# Patient Record
(demographics unavailable — no encounter records)

---

## 2025-04-09 NOTE — PLAN
[FreeTextEntry1] :  elevated lfts and hepatic steatosis on US abdomen  saw gi and will f/u stopped statin for now repeat cmp  leukopenia seen by hemeonc and will f/u  weight loss, multiple recent illlnesses, recent covid a few days ago seen by heme onc and gi check tsh and free t4 if no weight gain once illnesses resolve will consider ct abd pelvis/chest  erythematous papules w/ itching looks like some kind of insect bite as there are insertion sites relief w/ allegra advised to continue no relief with betamethasone  cream so advised to stop  labs drawn in office and will be sent to [facility] core lab  f/u 1month or sooner if issues arise pt agreed w/plan

## 2025-04-09 NOTE — HISTORY OF PRESENT ILLNESS
[FreeTextEntry8] : Patient presents for rash on abdomen and thighs, patient had a rash 3 weeks ago on back and new rash started the next day.  she said she had the same rash 3 weeks ago and then it resolved and then came back again after covid she took allegra and it helped and resolved the itching  she is off the statin for 3 weeks she had labs drawn today she saw Dr Valderrama today and will f/u 1 month she had labs drawn today w/ heme/onc w/ Dr Grijalva she has lost weight but she has been sick recently she had wbc repeated today she will f/u in 1month she is eating regularly and drinking water  she has 3 meals and 3 snacks

## 2025-04-09 NOTE — PHYSICAL EXAM
[No Lymphadenopathy] : no lymphadenopathy [Supple] : supple [No Edema] : there was no peripheral edema [Normal] : soft, non-tender, non-distended, no masses palpated, no HSM and normal bowel sounds [No Focal Deficits] : no focal deficits [Normal Affect] : the affect was normal [Normal Mood] : the mood was normal [Normal Insight/Judgement] : insight and judgment were intact [de-identified] : no calf tenderness b/l LE [de-identified] :  no guarding or rigidity [de-identified] : back with healed and scarred papules w/ insertion sites but no erythema just hyperpigmentation [de-identified] : multiple erythematous papules on abdomen and legs w/ insertion sites

## 2025-04-16 NOTE — HISTORY OF PRESENT ILLNESS
[FreeTextEntry1] : 64 y.o. female with PMHx of asthma and vit B12 deficiency seen initially in 2022 for goiter and Hashimoto's thyroiditis - did not require treatment. Presents today for evaluation of Hyperthyroidism. Patient c/o wt loss 15-20Lb over the last 3 months. Jitteriness, hair falling, fine hand tremor, palpitations, eye discomfort/pressure. Denies neck pain, sore throat or preceding URI. FHx - sister with Hx of Hyperthyroidism and FRITZ Tx. Labs significant for undetectable TSH <0.01 and mildly elevated FT4 1.9, No FT3 available

## 2025-04-16 NOTE — PHYSICAL EXAM
[Alert] : alert [Well Nourished] : well nourished [No Acute Distress] : no acute distress [Well Developed] : well developed [Normal Sclera/Conjunctiva] : normal sclera/conjunctiva [PERRL] : pupils equal, round and reactive to light [Normal Hearing] : hearing was normal [No Respiratory Distress] : no respiratory distress [Clear to Auscultation] : lungs were clear to auscultation bilaterally [Normal Rate] : heart rate was normal [Regular Rhythm] : with a regular rhythm [No Edema] : no peripheral edema [Normal Supraclavicular Nodes] : no supraclavicular lymphadenopathy [Normal Anterior Cervical Nodes] : no anterior cervical lymphadenopathy [No Clubbing, Cyanosis] : no clubbing  or cyanosis of the fingernails [Oriented x3] : oriented to person, place, and time [Normal Affect] : the affect was normal [Normal Insight/Judgement] : insight and judgment were intact [Normal Mood] : the mood was normal [No Lid Lag] : no lid lag [de-identified] : Supraorbital infiltration B/l with. Stare look with subtle proptosis R>L [de-identified] : Prominent firm, lobulated thyroid gland, No bruit [de-identified] : Very subtle fine hand tremor b/l

## 2025-04-16 NOTE — ASSESSMENT
[FreeTextEntry1] : 64 y.o. Female with PMHx of asthma and vit B12 deficiency seen initially in 2022 for goiter and Hashimoto's thyroiditis - did not require treatment. Presents today for evaluation of Hyperthyroidism. Patient c/o wt loss 15-20Lb over the last 3 months. Jitteriness, hair falling, fine hand tremor, palpitations, eye discomfort/pressure. Denies neck pain, sore throat or preceding URI. FHx - sister with Hx of Hyperthyroidism and FRITZ Tx. Labs significant for undetectable TSH <0.01 and mildly elevated FT4 1.9, No FT3 available  # Graves' Hyperthyroidism Mild clinical Symptoms Elevated TSI and TRab as well as TPO and TGab Start Methimazole 10 mg daily Declines Propranolol due to no significant palpitations and concern of low BP Will obtain thyroid US Repeat TFTs in 4-6 weeks. Patient will be called with results and recommendations.   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 Mild stare look with subtle b/l proptosis R>L Will refer to ophthalmology for evaluation.  # Goiter No compressive symptoms, neck pain, hoarseness or dysphagia Thyroid US (9/7/22) - diffusely heterogenous gland with No nodules. F/u Thyroid US   F/u in 3 months with NP

## 2025-05-21 NOTE — PHYSICAL EXAM
[No Lymphadenopathy] : no lymphadenopathy [Supple] : supple [Normal] : normal rate, regular rhythm, normal S1 and S2 and no murmur heard [No Edema] : there was no peripheral edema [No Focal Deficits] : no focal deficits [Normal Affect] : the affect was normal [Normal Mood] : the mood was normal [Normal Insight/Judgement] : insight and judgment were intact [de-identified] : exophthalmos improved  [de-identified] : no calf tenderness b/l LE

## 2025-05-21 NOTE — HISTORY OF PRESENT ILLNESS
[FreeTextEntry1] : Patient presents for follow up visit to go over results from [facility] blood work and other results for hyperthyroidism. Patient also says That Dr. Valderrama wants patient to do a liver scan and would like to talk to you about it. Patient was there on May14th. [de-identified] : Patient presents for follow up visit to go over results from [facility] blood work and other results for hyperthyroidism. Patient also says That Dr. Valderrama wants patient to do a liver scan and would like to talk to you about it. Patient was there on May14th.  her eyes have improved denies double vision she is gaining some weight she has a decreased appetite she is on methimazole 5mg bc she cannot tolerate 10mg she is back on the statin and denies muscle pain denies abdominal pain, nausea,vomiting

## 2025-07-02 NOTE — HEALTH RISK ASSESSMENT
[No falls in past year] : Patient reported no falls in the past year [Former] : Former [Smoke Detector] : smoke detector [Carbon Monoxide Detector] : carbon monoxide detector [Seat Belt] :  uses seat belt [Sunscreen] : uses sunscreen

## 2025-07-02 NOTE — PHYSICAL EXAM
[No Lymphadenopathy] : no lymphadenopathy [Supple] : supple [No Edema] : there was no peripheral edema [Normal Appearance] : normal in appearance [No Nipple Discharge] : no nipple discharge [No Axillary Lymphadenopathy] : no axillary lymphadenopathy [Normal] : soft, non-tender, non-distended, no masses palpated, no HSM and normal bowel sounds [Normal Posterior Cervical Nodes] : no posterior cervical lymphadenopathy [Normal Anterior Cervical Nodes] : no anterior cervical lymphadenopathy [No CVA Tenderness] : no CVA  tenderness [No Rash] : no rash [No Focal Deficits] : no focal deficits [Normal Affect] : the affect was normal [Normal Mood] : the mood was normal [Normal Insight/Judgement] : insight and judgment were intact [de-identified] : no calf tenderness b/l LE  [de-identified] :  no guarding or rigidity  [de-identified] : CN 2-12 INTACT, NORMAL STRENGTH UPPER AND LOWER EXT B/L 5/5, NORMAL RAPID ALTERNATING MOVEMENTS AND FINGER TO NOSE

## 2025-07-02 NOTE — PLAN
[FreeTextEntry1] :  CPE -Labs ordered by another physician will review  -Mammogram due  -Colonoscopy Screening  -Advised monthly breast exams -Annual depression screening - negative     dizziness bp low but she said this is her normal , no dizziness now  normal neuro exam advised to hydrate and eat regularly  -EKG- NSR @ 70  BPM, NORMAL AXIS, SHORT SD/NORMAL QT INTERVAL, NO ST/ T WAVE ABNORMALITIES NOTED EKG reviewed cardio consult advised  graves disease seeing endocrinologist continue methimazole 5mg po daily  hepatic steatosis seen by gi  and had a fibroscan will obtain records  f/u if no relief pt agreed w/plan advised if patient doesnt hear from me 1 week after testing to call office for results , patient agreed w/plan and understood.  addendum pt left the office and then she came back and said she was dizzy to the nurse. I was not in the office at the time as i went to lunch. KENNETH Gavin called me and told me her glucose finger stick was 101. She had not eaten since last night so they gave her apple juice and she felt better. I advised for her to go to the ED, KENNETH Gavin advised her that she spoke to me and i advised to go to the ED for cardiac monitoring/eval. She did not want to go to the ED . I called her cell phone from my cell # but there was no answer, i left a message for callback.

## 2025-07-02 NOTE — HISTORY OF PRESENT ILLNESS
[FreeTextEntry1] : Patient presents for physical. 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

## 2025-07-02 NOTE — REVIEW OF SYSTEMS
[Negative] : Heme/Lymph [Fever] : no fever [Chills] : no chills [Night Sweats] : no night sweats [Chest Pain] : no chest pain [Palpitations] : no palpitations [Shortness Of Breath] : no shortness of breath [Cough] : no cough [Abdominal Pain] : no abdominal pain [Nausea] : no nausea [Constipation] : no constipation [Diarrhea] : diarrhea [Vomiting] : no vomiting [Melena] : no melena [Dysuria] : no dysuria [Hematuria] : no hematuria [Frequency] : no frequency [Itching] : no itching [Mole Changes] : no mole changes [Skin Rash] : no skin rash [Headache] : no headache [Dizziness] : no dizziness [Fainting] : no fainting [Anxiety] : no anxiety [Depression] : no depression [FreeTextEntry2] : weight is stable

## 2025-07-16 NOTE — ASSESSMENT
[FreeTextEntry1] : 1.  Hyperthyroidism.  2.  Abnormal EKG.  3.  Incomplete RBBB. 4.  Dyslipidemia.  5.  Former smoker.  6.  Left carotid bruit.

## 2025-07-16 NOTE — CARDIOLOGY SUMMARY
[de-identified] : From [facility] on 7/2/2025: Sinus rhythm with a rate of 70 bpm, normal axis, normal MN interval 120 ms, normal QRS duration 83 ms, with incomplete RBBB, normal QT interval 398 ms. [de-identified] : Exercise stress test on 2/9/2022: She exercised for 9:20 minutes of the standard Wilfredo protocol with a normal baseline blood pressure. She had an appropriate blood pressure response to exercise and an appropriate heart rate response to exercise, achieving 85% of her MPHR. No ischemic ECG changes were seen.  [de-identified] : 2/16/2022: Normal LV size and wall thickness, normal systolic function with an EF of 60-65%. The RV is normal in size with normal sytsolic function. No AS, trace AI, no MS, no MR, no TS, trace TR, no PS and mild PI was seen.

## 2025-07-16 NOTE — DISCUSSION/SUMMARY
[___ Month(s)] : in [unfilled] month(s) [FreeTextEntry1] : The patient's heart rate and blood pressure are well controlled. I have asked her to continue with her current medications as prescribed without change.  I have asked the patient to undergo an exercise Cardiolite stress test to evaluate for myocardial ischemia. I explained that a nuclear stress test provides a higher degree of accuracy as compared to a treadmill stress test.   I have asked the patient to undergo an echocardiogram to assess LV size, wall thickness, systolic function, valvular function, and pulmonary artery systolic pressure.   I have asked the patient to undergo a carotid ultrasound to evaluate for carotid stenosis.   I have asked the patient to follow a low salt, low fat, low cholesterol diet. I have asked the patient not to engage in any new exercises or activities until after the cardiac work up is complete.   I have asked that the patient follow up with me in one months' time, or sooner with any change in symptoms.   I, Risa Steiner, am scribing for and the presence of Dr. Antwan Rodriguez, the following sections HISTORY OF PRESENT ILLNESSS; CARDIOLOGY SUMMARY; ACTIVE PROBLEMS; PAST MEDICAL HISTORY; PAST SURGICAL HISTORY; FAMILY HISTORY; SOCIAL HISTORY; REVIEW OF SYSTEMS; PHYSICAL EXAM; ASSESSMENT; PLAN.

## 2025-07-16 NOTE — PHYSICAL EXAM
[Well Developed] : well developed [Well Nourished] : well nourished [No Acute Distress] : no acute distress [Normal Conjunctiva] : normal conjunctiva [Normal Venous Pressure] : normal venous pressure [Normal S1, S2] : normal S1, S2 [No Murmur] : no murmur [No Rub] : no rub [No Gallop] : no gallop [Clear Lung Fields] : clear lung fields [Good Air Entry] : good air entry [No Respiratory Distress] : no respiratory distress  [Soft] : abdomen soft [Non Tender] : non-tender [No Masses/organomegaly] : no masses/organomegaly [Normal Bowel Sounds] : normal bowel sounds [Normal Gait] : normal gait [No Edema] : no edema [No Cyanosis] : no cyanosis [No Clubbing] : no clubbing [No Varicosities] : no varicosities [No Rash] : no rash [No Skin Lesions] : no skin lesions [Moves all extremities] : moves all extremities [No Focal Deficits] : no focal deficits [Normal Speech] : normal speech [Alert and Oriented] : alert and oriented [Normal memory] : normal memory [Carotid Bruit] : carotid bruit [de-identified] : Left

## 2025-07-16 NOTE — FAMILY HISTORY
[TextEntry] : The patient's mother is 91. She has dementia.  The patient's father passed away of an MI at age 83.

## 2025-07-16 NOTE — SOCIAL HISTORY
[TextEntry] : The patient is . She has one son and one daughter. Her son is a type I diabetic. She quit smoking at age 35. Prior to this she smoked one pack monthly from the age of 19. She has 3 alcoholic drinks monthly. She denies any drug use or vaping.  She is a dental hygienist.

## 2025-07-16 NOTE — HISTORY OF PRESENT ILLNESS
[FreeTextEntry1] : Masha Hope is a 64-year-old woman with a history of hyperthyroidism, dyslipidemia, who presents today for cardiovascular consultation. The patient had last seen a cardiologist in 2022. She was referred by her primary for evaluation of an abnormal EKG. Her blood pressure has been labile with her overactive thyroid. Her methimazole was recently increased from 5 mg daily to 10 mg daily. She denies recent chest pain, shortness of breath, palpitations, syncope, orthopnea, or PND. She has been taking her medications as prescribed.